# Patient Record
Sex: FEMALE | Race: WHITE | URBAN - METROPOLITAN AREA
[De-identification: names, ages, dates, MRNs, and addresses within clinical notes are randomized per-mention and may not be internally consistent; named-entity substitution may affect disease eponyms.]

---

## 2018-06-13 ENCOUNTER — HISTORICAL (OUTPATIENT)
Dept: SURGERY | Facility: HOSPITAL | Age: 43
End: 2018-06-13

## 2018-06-13 LAB
ABS NEUT (OLG): 4.1 X10(3)/MCL (ref 1.5–6.9)
ALBUMIN SERPL-MCNC: 3.9 GM/DL (ref 3.4–5)
ALBUMIN/GLOB SERPL: 1.1 RATIO
ALP SERPL-CCNC: 57 UNIT/L (ref 30–113)
ALT SERPL-CCNC: 16 UNIT/L (ref 10–45)
APTT PPP: 28.4 SECOND(S) (ref 25–35)
AST SERPL-CCNC: 15 UNIT/L (ref 15–37)
B-HCG SERPL QL: NEGATIVE
BASOPHILS # BLD AUTO: 0 X10(3)/MCL (ref 0–0.1)
BASOPHILS NFR BLD AUTO: 0 % (ref 0–1)
BILIRUB SERPL-MCNC: 0.3 MG/DL (ref 0.1–0.9)
BILIRUBIN DIRECT+TOT PNL SERPL-MCNC: 0.1 MG/DL (ref 0–0.3)
BILIRUBIN DIRECT+TOT PNL SERPL-MCNC: 0.2 MG/DL
BUN SERPL-MCNC: 7 MG/DL (ref 10–20)
CALCIUM SERPL-MCNC: 8.6 MG/DL (ref 8–10.5)
CHLORIDE SERPL-SCNC: 106 MMOL/L (ref 100–108)
CO2 SERPL-SCNC: 27 MMOL/L (ref 21–35)
CREAT SERPL-MCNC: 0.85 MG/DL (ref 0.7–1.3)
EOSINOPHIL # BLD AUTO: 0.1 X10(3)/MCL (ref 0–0.6)
EOSINOPHIL NFR BLD AUTO: 2 % (ref 0–5)
ERYTHROCYTE [DISTWIDTH] IN BLOOD BY AUTOMATED COUNT: 14.9 % (ref 11.5–17)
GLOBULIN SER-MCNC: 3.5 GM/DL
GLUCOSE SERPL-MCNC: 77 MG/DL (ref 75–116)
HCT VFR BLD AUTO: 42.4 % (ref 36–48)
HGB BLD-MCNC: 13.4 GM/DL (ref 12–16)
INR PPP: 1 (ref 0–1.2)
LYMPHOCYTES # BLD AUTO: 1.9 X10(3)/MCL (ref 0.5–4.1)
LYMPHOCYTES NFR BLD AUTO: 27.7 % (ref 15–40)
MCH RBC QN AUTO: 30 PG (ref 27–34)
MCHC RBC AUTO-ENTMCNC: 32 GM/DL (ref 31–36)
MCV RBC AUTO: 94 FL (ref 80–99)
MONOCYTES # BLD AUTO: 0.6 X10(3)/MCL (ref 0–1.1)
MONOCYTES NFR BLD AUTO: 9 % (ref 4–12)
NEUTROPHILS # BLD AUTO: 4.1 X10(3)/MCL (ref 1.5–6.9)
NEUTROPHILS NFR BLD AUTO: 61 % (ref 43–75)
PLATELET # BLD AUTO: 288 X10(3)/MCL (ref 140–400)
PMV BLD AUTO: 10.2 FL (ref 6.8–10)
POTASSIUM SERPL-SCNC: 4 MMOL/L (ref 3.6–5.2)
PROT SERPL-MCNC: 7.4 GM/DL (ref 6.4–8.2)
PROTHROMBIN TIME: 10.1 SECOND(S) (ref 9–12)
RBC # BLD AUTO: 4.52 X10(6)/MCL (ref 4.2–5.4)
SODIUM SERPL-SCNC: 141 MMOL/L (ref 135–145)
WBC # SPEC AUTO: 6.7 X10(3)/MCL (ref 4.5–11.5)

## 2018-06-18 ENCOUNTER — HISTORICAL (OUTPATIENT)
Dept: ANESTHESIOLOGY | Facility: HOSPITAL | Age: 43
End: 2018-06-18

## 2022-04-30 NOTE — OP NOTE
PREOPERATIVE DIAGNOSIS:  Midepigastric abdominal pain.    PROCEDURE:  Esophagogastroduodenoscopy with biopsy of the antrum.    POSTOPERATIVE DIAGNOSES:    1. Normal duodenum in all 4 portions and into the jejunum.    2. Severe gastritis of the antrum and fundus of the stomach requiring biopsy with cold biopsy forceps.    3. A 2-3 cm hiatal hernia with the Z-line at 39 cm.    4. Normal esophagus, cricopharyngeus muscle, vocal cords.    PLAN:    1. Check MRCP of common bile duct.    2. Check an ultrasound of the abdomen and pelvis.    3. Zofran for nausea.    4. Evaluation of cold biopsy results from the antrum.    INDICATIONS:  The patient is a 42-year-old  female with anxiety and depressive disorder.  Smokes a pack a day for about 12 years.  Had a previous cholecystectomy, had back fusion at L4-5 and takes nonsteroidals for her back fusion.  The patient had a colonoscopy 20 years ago and recently developed midepigastric abdominal pain, fatigue, sleepy, tired, nausea, vomiting, hemoglobin 13, hematocrit 40, white count was 8 on 05/16/2018.  The patient had an ultrasound in 2016 that was consistent with a 1.1 cm common bile duct.  CT scan on 05/29/2018 showed some hepatic cysts and some constipation, but no other findings.  The patient was scheduled for an EGD to evaluate further.    PROCEDURE IN DETAIL:  The patient was brought to the GI suite, underwent sedation, and examination of the esophagus, stomach, and duodenum.     The duodenum was normal in all 4 portions and into the jejunum.  The antrum, fundus, and cardia of the stomach had evidence of a gastritis focus, mostly on the antrum.  Biopsies were taken for histopathology and H pylori.  There was a 3 cm hiatal hernia and the Z-line at 39 cm.  The esophagus, cricopharyngeus muscle, vocal cords were normal throughout.  No other abnormalities were seen.  The patient overall did very well and had no problems or difficulties.    SUMMARY:  I will  check her pathology reports in the office and give her some Carafate, Pepcid and Pepto-Bismol to see if those assist her in her general pains and problems.  The patient in addition to this will have an MRCP done to evaluate the common duct.  Again, I will notify Ms. Leny Decker her nurse practitioner of my findings.  I appreciate the consultation referral from Ms. Leny Decker.        ERA/EDNA   DD: 06/18/2018 1305   DT: 06/18/2018 1321  Job # [id number]    cc: LENY DECKER NP